# Patient Record
Sex: MALE | Employment: UNEMPLOYED | URBAN - METROPOLITAN AREA
[De-identification: names, ages, dates, MRNs, and addresses within clinical notes are randomized per-mention and may not be internally consistent; named-entity substitution may affect disease eponyms.]

---

## 2019-03-07 ENCOUNTER — TRANSFERRED RECORDS (OUTPATIENT)
Dept: HEALTH INFORMATION MANAGEMENT | Facility: CLINIC | Age: 49
End: 2019-03-07

## 2019-07-31 ENCOUNTER — TRANSFERRED RECORDS (OUTPATIENT)
Dept: HEALTH INFORMATION MANAGEMENT | Facility: CLINIC | Age: 49
End: 2019-07-31

## 2019-09-24 ENCOUNTER — TRANSFERRED RECORDS (OUTPATIENT)
Dept: HEALTH INFORMATION MANAGEMENT | Facility: CLINIC | Age: 49
End: 2019-09-24

## 2019-10-14 ENCOUNTER — PRE VISIT (OUTPATIENT)
Dept: NEUROLOGY | Facility: CLINIC | Age: 49
End: 2019-10-14

## 2019-10-14 NOTE — TELEPHONE ENCOUNTER
FUTURE VISIT INFORMATION      FUTURE VISIT INFORMATION:    Date: 11/23/2019    Time: 830AM    [place] [facility] [place]FORMATION:    Referring provider:      Referring providers clinic:  [facility]     Reason for visit/diagnosis  Headaches and Cognitive Impairment     RECORDS REQUESTED FROM:       Clinic name Comments Records Status Imaging Status   [facility]  No Records in Care Everywhere  Requested  Requested

## 2019-11-23 ENCOUNTER — OFFICE VISIT (OUTPATIENT)
Dept: NEUROLOGY | Facility: CLINIC | Age: 49
End: 2019-11-23
Payer: COMMERCIAL

## 2019-11-23 VITALS
SYSTOLIC BLOOD PRESSURE: 135 MMHG | HEART RATE: 87 BPM | WEIGHT: 171.9 LBS | OXYGEN SATURATION: 100 % | DIASTOLIC BLOOD PRESSURE: 87 MMHG

## 2019-11-23 DIAGNOSIS — R41.3 MEMORY LOSS: Primary | ICD-10-CM

## 2019-11-23 DIAGNOSIS — F32.A DEPRESSION, UNSPECIFIED DEPRESSION TYPE: ICD-10-CM

## 2019-11-23 RX ORDER — AMITRIPTYLINE HYDROCHLORIDE 10 MG/1
TABLET ORAL
Refills: 2 | COMMUNITY
Start: 2019-11-05 | End: 2019-11-23

## 2019-11-23 RX ORDER — IBUPROFEN 800 MG/1
800 TABLET, FILM COATED ORAL EVERY 8 HOURS PRN
COMMUNITY

## 2019-11-23 RX ORDER — CYCLOBENZAPRINE HCL 10 MG
TABLET ORAL
Refills: 1 | COMMUNITY
Start: 2019-11-05

## 2019-11-23 RX ORDER — SERTRALINE HYDROCHLORIDE 25 MG/1
25 TABLET, FILM COATED ORAL SEE ADMIN INSTRUCTIONS
Qty: 90 TABLET | Refills: 1 | Status: SHIPPED | OUTPATIENT
Start: 2019-11-23

## 2019-11-23 RX ORDER — TRAMADOL HYDROCHLORIDE 50 MG/1
TABLET ORAL
Refills: 0 | COMMUNITY
Start: 2019-11-05

## 2019-11-23 RX ORDER — ACETAMINOPHEN 160 MG
1 TABLET,DISINTEGRATING ORAL DAILY
COMMUNITY

## 2019-11-23 SDOH — HEALTH STABILITY: MENTAL HEALTH: HOW OFTEN DO YOU HAVE A DRINK CONTAINING ALCOHOL?: NEVER

## 2019-11-23 ASSESSMENT — ENCOUNTER SYMPTOMS
WEAKNESS: 1
INSOMNIA: 1
EYE WATERING: 1
NUMBNESS: 1
ABDOMINAL PAIN: 0
BLOOD IN STOOL: 0
TREMORS: 0
HEADACHES: 1
ARTHRALGIAS: 1
SPEECH CHANGE: 1
EYE REDNESS: 0
TINGLING: 1
MYALGIAS: 1
RECTAL PAIN: 0
DECREASED CONCENTRATION: 1
LOSS OF CONSCIOUSNESS: 0
VOMITING: 0
HEARTBURN: 0
DIARRHEA: 1
DIZZINESS: 1
BLOATING: 0
DEPRESSION: 1
JAUNDICE: 0
STIFFNESS: 1
MUSCLE CRAMPS: 1
NAUSEA: 0
DOUBLE VISION: 1
PANIC: 1
CONSTIPATION: 1
JOINT SWELLING: 1
MUSCLE WEAKNESS: 1
MEMORY LOSS: 1
PARALYSIS: 0
NECK PAIN: 1
EYE IRRITATION: 1
NERVOUS/ANXIOUS: 1
BACK PAIN: 1
EYE PAIN: 1
SEIZURES: 0
DISTURBANCES IN COORDINATION: 1
BOWEL INCONTINENCE: 0

## 2019-11-23 ASSESSMENT — PATIENT HEALTH QUESTIONNAIRE - PHQ9
SUM OF ALL RESPONSES TO PHQ QUESTIONS 1-9: 27
10. IF YOU CHECKED OFF ANY PROBLEMS, HOW DIFFICULT HAVE THESE PROBLEMS MADE IT FOR YOU TO DO YOUR WORK, TAKE CARE OF THINGS AT HOME, OR GET ALONG WITH OTHER PEOPLE: EXTREMELY DIFFICULT
SUM OF ALL RESPONSES TO PHQ QUESTIONS 1-9: 27

## 2019-11-23 ASSESSMENT — PAIN SCALES - GENERAL: PAINLEVEL: MODERATE PAIN (5)
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
## 2019-11-23 NOTE — NURSING NOTE
Chief Complaint   Patient presents with     Consult     [facility] NEW - COGNITIVE IMPAIRMENT, NUMBNESS/TINGLING/WEAKNESS IN ARMS HANDS BACK SPASMS, HEADACHES       Tushar Ni, EMT
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
## 2020-03-11 ENCOUNTER — HEALTH MAINTENANCE LETTER (OUTPATIENT)
Age: 50
End: 2020-03-11

## 2021-01-03 ENCOUNTER — HEALTH MAINTENANCE LETTER (OUTPATIENT)
Age: 51
End: 2021-01-03

## 2021-04-25 ENCOUNTER — HEALTH MAINTENANCE LETTER (OUTPATIENT)
Age: 51
End: 2021-04-25

## 2021-10-10 ENCOUNTER — HEALTH MAINTENANCE LETTER (OUTPATIENT)
Age: 51
End: 2021-10-10

## 2022-05-22 ENCOUNTER — HEALTH MAINTENANCE LETTER (OUTPATIENT)
Age: 52
End: 2022-05-22

## 2022-09-18 ENCOUNTER — HEALTH MAINTENANCE LETTER (OUTPATIENT)
Age: 52
End: 2022-09-18

## 2023-06-04 ENCOUNTER — HEALTH MAINTENANCE LETTER (OUTPATIENT)
Age: 53
End: 2023-06-04